# Patient Record
(demographics unavailable — no encounter records)

---

## 2024-12-10 NOTE — PHYSICAL EXAM
[5___] : quadriceps 5[unfilled]/5 [Equivocal] : equivocal Kenney [] : mildly antalgic [Right] : right knee [AP] : anteroposterior [Lateral] : lateral [[place]] : skyline [Tunnels and hardware in proper position] : Tunnels and hardware in proper position [TWNoteComboBox7] : flexion 130 degrees

## 2024-12-10 NOTE — REASON FOR VISIT
[FreeTextEntry2] : new patient - right knee pain recurred R acl reconst  with tib ant allograft and MM

## 2024-12-10 NOTE — HISTORY OF PRESENT ILLNESS
[de-identified] : 12/10/24 - pt is here for right knee, pt stated she torn MCL ACL and meniscus m, had sx in 2019 with Manaqib. and di ok . pt stated knee can give out, buckle and shift with walking, pain behind knee travels up to hip, states a tight pulling pain in knee to hip. Worse when more active and even when sits no recent injury, tried Mobic without elp

## 2024-12-10 NOTE — DISCUSSION/SUMMARY
[de-identified] : modify activities use elastic sleeve for structural support try OTC meds ice as needed try topical lidocaine for pain control reviewed current medications used by this patient home exercises for functional return  RE:  CASSIE BRUNSON   Acct #- [id number]   Attention:  Nurse Reviewer /Medical Director    Based on my patient's condition, I strongly believe that the MRI arth R knee is medically.necessary.   The patient has failed oral meds, injections and PT and conservative treatment in combination or by themselves and therefore needs the MRI.   The MRI will dictate further treatment t recommendations.

## 2024-12-13 NOTE — HISTORY OF PRESENT ILLNESS
[Lower back] : lower back [6] : 6 [5] : 5 [Burning] : burning [Dull/Aching] : dull/aching [Sharp] : sharp [Constant] : constant [Nothing helps with pain getting better] : Nothing helps with pain getting better [Walking] : walking [Full time] : Work status: full time [de-identified] : 6/13/23 Lumbar MRI  - report noted in chart.  Findings: Alignment appears anatomic. There is no acute fracture. The visualized lower thoracic spinal cord  appears intact. There are no gross paravertebral soft tissue masses. Moderate epidural lipomatosis at the  lumbosacral junction. L1-L2: There is no posterior disc herniation. L2-L3: There is no posterior disc herniation. There is mild bilateral facet hypertrophy. L3-L4: There is no posterior disc herniation. There is mild bilateral facet hypertrophy. L4-L5: There is no posterior disc herniation. There is mild bilateral facet arthrosis. There is a tiny synovial cyst  measuring 2 mm just lateral to the left facet. There is no exiting nerve root impingement. L5-S1: There is slight bilateral facet hypertrophy. Ind. review-  agree ================================================ 4/4/23- 37 y/o F presents for chronic midline lower back pain, worsening in the last year. Associated radiation down RLE anterolaterally to the knee. Aggravated by heavy/repetitive lifting (works as a ) and prolonged sitting/standing. Has been taking meloxicam, without relief. Denies prior physical therapy/surgeries for the lower back. Denies b/b incontinence.   PMH: denies 6/7/23- LBP worse since last visit despite PT. Radiation down RLE anterolaterally to the knee. Aggravated by prolonged sitting/standing. Additionally reports neck spasm/stiffness X a couple of weeks. Denies pain/N/T in BUEs.   states she has been having a burning pain in the lower back that travels down the right leg. 06/21/2023 Pt states she is doing worse than last visit.   03/27/2024 - pt states its still doing bad from the last visit. Has been in PT.  12/13/24- sxs same, attending PT [] : no [FreeTextEntry7] : down the right leg. [de-identified] : Physical Therapy

## 2024-12-13 NOTE — ASSESSMENT
[FreeTextEntry1] : PT rx given  Muscle Relaxants- To help decrease muscle spasm and assist with pain relief. Advised of sedating effects and instructed not to drive, operate heavy machinery, or take with other sedating medications.  NSAIDs- Patient warned of risk of medication to GI tract, increased blood pressure, cardiac risk, and risk of fluid retention.  Advised to clear medication with internist or PCP if any concurrent health problem with heart, blood pressure, or GI system exists.

## 2024-12-13 NOTE — RETURN TO WORK/SCHOOL
[FreeTextEntry1] : Patient has an active medical condition under our care. Expected duration of 3-6 months.

## 2024-12-13 NOTE — IMAGING
[de-identified] : CSPINE midline cervical tenderness, right trapezial/rhomboid tenderness  LSPINE Palpation: midline lumbar tenderness, right lumbar paraspinal muscle tenderness, left SIJ tenderness ROM: Full with stiffness Strength: nonfocal BLEs Sensation: nonfocal BLEs - straight leg raise. Tight hamstrings bilaterally  Bilateral hips- Palpation: R>L  GT TTP

## 2024-12-19 NOTE — REASON FOR VISIT
[FreeTextEntry2] : Patient seen 12/16/2024 for an MRA/Contrast. An injection of 25 cc's Doltarem was injected into the right knee after verbal consent using sterile technique. The patient tolerated the procedure well.

## 2024-12-23 NOTE — HISTORY OF PRESENT ILLNESS
[6] : 6 [Dull/Aching] : dull/aching [Localized] : localized [Throbbing] : throbbing [Tightness] : tightness [Constant] : constant [Nothing helps with pain getting better] : Nothing helps with pain getting better [Standing] : standing [Walking] : walking [de-identified] : 12/10/24 - pt is here for right knee, pt stated she torn MCL ACL and meniscus, had sx in 2019 with Manaqib. and did ok . pt stated knee can give out, buckle and shift with walking, pain behind knee travels up to hip, states a tight pulling pain in knee to hip. Worse when more active and even when sits  [] : no [FreeTextEntry1] : right knee  [FreeTextEntry6] : giving out  [de-identified] : Dr. Dowling [de-identified] : Arthogram [de-identified] : meloxicam and celebrex used as needed

## 2024-12-23 NOTE — PHYSICAL EXAM
[5___] : quadriceps 5[unfilled]/5 [Equivocal] : equivocal Kenney [Right] : right knee [AP] : anteroposterior [Lateral] : lateral [[place]] : skyline [Tunnels and hardware in proper position] : Tunnels and hardware in proper position [] : negative Lachmann [TWNoteComboBox7] : flexion 130 degrees

## 2024-12-23 NOTE — DISCUSSION/SUMMARY
[de-identified] : Patient allowed to gently start resuming activities. Discussed change to medication prescription and usage. Offered cortisone steroid injection.for pain control Bracing options discussed with patient. Hyaluronic Acid inj pamphlet given to pt. try topical lidocaine for pain reviewed current medications being used by this patient Home exercise for functional improvement

## 2025-03-25 NOTE — PHYSICAL EXAM
[Right] : right knee [5___] : quadriceps 5[unfilled]/5 [Equivocal] : equivocal Kenney [] : mildly antalgic [TWNoteComboBox7] : flexion 130 degrees

## 2025-03-25 NOTE — HISTORY OF PRESENT ILLNESS
[Dull/Aching] : dull/aching [Sharp] : sharp [Tightness] : tightness [Constant] : constant [Leisure] : leisure [Sleep] : sleep [Rest] : rest [Meds] : meds [Ice] : ice [Standing] : standing [Walking] : walking [Stairs] : stairs [de-identified] : still has right knee, pt stated she had torn MCL ACL and meniscus, had sx in 2019 with Manaqib. and did ok. pt stated knee can give out, buckle and shift with walking and standing, pain behind knee travels up to hip, states a tight pulling pain in knee to hip. Worse when more active and even when sits has numbness ant shin [6] : 6 [Localized] : localized [Throbbing] : throbbing [Nothing helps with pain getting better] : Nothing helps with pain getting better [] : yes [FreeTextEntry1] : right knee  [FreeTextEntry5] : patient has been experiencing elevated pains for the last couple of weeks. [FreeTextEntry6] : giving out  [FreeTextEntry9] : prescribed meds/ elevation [de-identified] : Dr. Dowling [de-identified] : Arthogram [de-identified] : meloxicam and celebrex used as needed

## 2025-03-25 NOTE — DISCUSSION/SUMMARY
[de-identified] : Patient allowed to gently start resuming activities. Discussed change to medication prescription and usage. Offered cortisone steroid injection.for pain control Bracing options discussed with patient. Hyaluronic Acid inj pamphlet given to pt. try topical lidocaine for pain reviewed current medications being used by this patient Home exercise for functional improvement I recc seeing Dr Olivarez who did the operation to answer her multitude of questions

## 2025-04-02 NOTE — IMAGING
[de-identified] : CSPINE midline cervical tenderness, right trapezial/rhomboid tenderness Motor 5/5  LSPINE Palpation: midline lumbar tenderness, right lumbar paraspinal muscle tenderness, left SIJ tenderness ROM: Full with stiffness Strength: nonfocal BLEs Sensation: nonfocal BLEs - straight leg raise. Tight hamstrings bilaterally  Bilateral hips- Palpation: R>L  GT TTP    [Straightening consistent with spasm] : Straightening consistent with spasm

## 2025-04-02 NOTE — HISTORY OF PRESENT ILLNESS
[Lower back] : lower back [6] : 6 [5] : 5 [Burning] : burning [Dull/Aching] : dull/aching [Sharp] : sharp [Constant] : constant [Nothing helps with pain getting better] : Nothing helps with pain getting better [Walking] : walking [Full time] : Work status: full time [de-identified] : 6/13/23 Lumbar MRI  - report noted in chart.  Findings: Alignment appears anatomic. There is no acute fracture. The visualized lower thoracic spinal cord  appears intact. There are no gross paravertebral soft tissue masses. Moderate epidural lipomatosis at the  lumbosacral junction. L1-L2: There is no posterior disc herniation. L2-L3: There is no posterior disc herniation. There is mild bilateral facet hypertrophy. L3-L4: There is no posterior disc herniation. There is mild bilateral facet hypertrophy. L4-L5: There is no posterior disc herniation. There is mild bilateral facet arthrosis. There is a tiny synovial cyst  measuring 2 mm just lateral to the left facet. There is no exiting nerve root impingement. L5-S1: There is slight bilateral facet hypertrophy. Ind. review-  agree ================================================ 4/4/23- 37 y/o F presents for chronic midline lower back pain, worsening in the last year. Associated radiation down RLE anterolaterally to the knee. Aggravated by heavy/repetitive lifting (works as a ) and prolonged sitting/standing. Has been taking meloxicam, without relief. Denies prior physical therapy/surgeries for the lower back. Denies b/b incontinence.   PMH: denies 6/7/23- LBP worse since last visit despite PT. Radiation down RLE anterolaterally to the knee. Aggravated by prolonged sitting/standing. Additionally reports neck spasm/stiffness X a couple of weeks. Denies pain/N/T in BUEs.   states she has been having a burning pain in the lower back that travels down the right leg. 06/21/2023 Pt states she is doing worse than last visit.   03/27/2024 - pt states its still doing bad from the last visit. Has been in PT.  12/13/24- sxs same, attending PT 4/2/25- Here for neck and LBP follow up. She attended PT but feels her pain is worsening. She is taking Nabuemteone. No recent injury. She has some tinging in the right lateral hip.  [] : no [FreeTextEntry7] : down the right leg. [de-identified] : Physical Therapy

## 2025-04-11 NOTE — ASSESSMENT
[FreeTextEntry1] : No HNPs. Discussed that there is no indication for surgery.  Discussed TPI C/w PT f/u 6 weeks Referral to rheum  Muscle Relaxants- To help decrease muscle spasm and assist with pain relief. Advised of sedating effects and instructed not to drive, operate heavy machinery, or take with other sedating medications.

## 2025-04-11 NOTE — HISTORY OF PRESENT ILLNESS
[Lower back] : lower back [6] : 6 [5] : 5 [Burning] : burning [Dull/Aching] : dull/aching [Sharp] : sharp [Constant] : constant [Nothing helps with pain getting better] : Nothing helps with pain getting better [Walking] : walking [Full time] : Work status: full time [de-identified] : 6/13/23 Lumbar MRI  - report noted in chart.  Findings: Alignment appears anatomic. There is no acute fracture. The visualized lower thoracic spinal cord  appears intact. There are no gross paravertebral soft tissue masses. Moderate epidural lipomatosis at the  lumbosacral junction. L1-L2: There is no posterior disc herniation. L2-L3: There is no posterior disc herniation. There is mild bilateral facet hypertrophy. L3-L4: There is no posterior disc herniation. There is mild bilateral facet hypertrophy. L4-L5: There is no posterior disc herniation. There is mild bilateral facet arthrosis. There is a tiny synovial cyst  measuring 2 mm just lateral to the left facet. There is no exiting nerve root impingement. L5-S1: There is slight bilateral facet hypertrophy. Ind. review-  agree  4/7/25 Cervical &  Lumbar MRI  - report noted in chart.  Ind. review- No HNPs ================================================ 4/4/23- 37 y/o F presents for chronic midline lower back pain, worsening in the last year. Associated radiation down RLE anterolaterally to the knee. Aggravated by heavy/repetitive lifting (works as a ) and prolonged sitting/standing. Has been taking meloxicam, without relief. Denies prior physical therapy/surgeries for the lower back. Denies b/b incontinence.   PMH: denies 6/7/23- LBP worse since last visit despite PT. Radiation down RLE anterolaterally to the knee. Aggravated by prolonged sitting/standing. Additionally reports neck spasm/stiffness X a couple of weeks. Denies pain/N/T in BUEs.   states she has been having a burning pain in the lower back that travels down the right leg. 06/21/2023 Pt states she is doing worse than last visit.   03/27/2024 - pt states its still doing bad from the last visit. Has been in PT.  12/13/24- sxs same, attending PT 4/2/25- Here for neck and LBP follow up. She attended PT but feels her pain is worsening. She is taking Nabuemteone. No recent injury. She has some tinging in the right lateral hip.  4/11/25- MRI f/u [] : no [FreeTextEntry7] : down the right leg. [de-identified] : Physical Therapy

## 2025-04-11 NOTE — IMAGING
[Straightening consistent with spasm] : Straightening consistent with spasm [de-identified] : CSPINE midline cervical tenderness, right trapezial/rhomboid tenderness Motor 5/5  LSPINE Palpation: midline lumbar tenderness, right lumbar paraspinal muscle tenderness, left SIJ tenderness ROM: Full with stiffness Strength: nonfocal BLEs Sensation: nonfocal BLEs - straight leg raise. Tight hamstrings bilaterally  Bilateral hips- Palpation: R>L  GT TTP